# Patient Record
Sex: FEMALE | URBAN - METROPOLITAN AREA
[De-identification: names, ages, dates, MRNs, and addresses within clinical notes are randomized per-mention and may not be internally consistent; named-entity substitution may affect disease eponyms.]

---

## 2017-02-01 ENCOUNTER — FOLLOW UP (OUTPATIENT)
Dept: URBAN - METROPOLITAN AREA CLINIC 35 | Facility: CLINIC | Age: 72
End: 2017-02-01

## 2017-02-01 DIAGNOSIS — H35.721: ICD-10-CM

## 2017-02-01 DIAGNOSIS — H35.3120: ICD-10-CM

## 2017-02-01 DIAGNOSIS — H35.3211: ICD-10-CM

## 2017-02-01 PROCEDURE — 2019F DILATED MACUL EXAM DONE: CPT

## 2017-02-01 PROCEDURE — G8427 DOCREV CUR MEDS BY ELIG CLIN: HCPCS

## 2017-02-01 PROCEDURE — 4177F TALK PT/CRGVR RE AREDS PREV: CPT

## 2017-02-01 PROCEDURE — 92012 INTRM OPH EXAM EST PATIENT: CPT

## 2017-02-01 PROCEDURE — 92134 CPTRZ OPH DX IMG PST SGM RTA: CPT

## 2017-02-01 PROCEDURE — 4040F PNEUMOC VAC/ADMIN/RCVD: CPT | Mod: 8P

## 2017-02-01 ASSESSMENT — VISUAL ACUITY: OD_SC: 20/50+2

## 2017-02-01 ASSESSMENT — TONOMETRY: OD_IOP_MMHG: 12

## 2017-03-15 ENCOUNTER — FOLLOW UP (OUTPATIENT)
Dept: URBAN - METROPOLITAN AREA CLINIC 35 | Facility: CLINIC | Age: 72
End: 2017-03-15

## 2017-03-15 DIAGNOSIS — H35.3120: ICD-10-CM

## 2017-03-15 DIAGNOSIS — H35.3231: ICD-10-CM

## 2017-03-15 DIAGNOSIS — H35.721: ICD-10-CM

## 2017-03-15 PROCEDURE — 2019F DILATED MACUL EXAM DONE: CPT

## 2017-03-15 PROCEDURE — 92014 COMPRE OPH EXAM EST PT 1/>: CPT | Mod: 25

## 2017-03-15 PROCEDURE — 4040F PNEUMOC VAC/ADMIN/RCVD: CPT | Mod: 8P

## 2017-03-15 PROCEDURE — 4177F TALK PT/CRGVR RE AREDS PREV: CPT

## 2017-03-15 PROCEDURE — 92134 CPTRZ OPH DX IMG PST SGM RTA: CPT

## 2017-03-15 PROCEDURE — 67028 INJECTION EYE DRUG: CPT

## 2017-03-15 PROCEDURE — G8427 DOCREV CUR MEDS BY ELIG CLIN: HCPCS

## 2017-03-15 PROCEDURE — 92235 FLUORESCEIN ANGRPH MLTIFRAME: CPT

## 2017-03-15 PROCEDURE — 92250 FUNDUS PHOTOGRAPHY W/I&R: CPT

## 2017-03-15 ASSESSMENT — TONOMETRY
OS_IOP_MMHG: 11
OD_IOP_MMHG: 12

## 2017-03-15 ASSESSMENT — VISUAL ACUITY
OS_SC: 20/100
OD_SC: 20/40-1

## 2017-03-29 ENCOUNTER — PROCEDURE ONLY (OUTPATIENT)
Dept: URBAN - METROPOLITAN AREA CLINIC 35 | Facility: CLINIC | Age: 72
End: 2017-03-29

## 2017-03-29 DIAGNOSIS — H35.721: ICD-10-CM

## 2017-03-29 PROCEDURE — 67028 INJECTION EYE DRUG: CPT

## 2017-03-29 ASSESSMENT — TONOMETRY: OD_IOP_MMHG: 20

## 2017-03-29 ASSESSMENT — VISUAL ACUITY: OD_SC: 20/30-1

## 2017-04-12 ENCOUNTER — FOLLOW UP (OUTPATIENT)
Dept: URBAN - METROPOLITAN AREA CLINIC 35 | Facility: CLINIC | Age: 72
End: 2017-04-12

## 2017-04-12 DIAGNOSIS — H35.3231: ICD-10-CM

## 2017-04-12 DIAGNOSIS — H35.721: ICD-10-CM

## 2017-04-12 PROCEDURE — 92012 INTRM OPH EXAM EST PATIENT: CPT

## 2017-04-12 PROCEDURE — 4040F PNEUMOC VAC/ADMIN/RCVD: CPT | Mod: 8P

## 2017-04-12 PROCEDURE — G8428 CUR MEDS NOT DOCUMENT: HCPCS

## 2017-04-12 PROCEDURE — 2019F DILATED MACUL EXAM DONE: CPT

## 2017-04-12 PROCEDURE — 4177F TALK PT/CRGVR RE AREDS PREV: CPT

## 2017-04-12 PROCEDURE — 92134 CPTRZ OPH DX IMG PST SGM RTA: CPT

## 2017-04-12 ASSESSMENT — TONOMETRY
OD_IOP_MMHG: 16
OS_IOP_MMHG: 17

## 2017-04-12 ASSESSMENT — VISUAL ACUITY
OS_CC: 20/40+1
OD_SC: 20/40+2

## 2017-04-26 ENCOUNTER — FOLLOW UP (OUTPATIENT)
Dept: URBAN - METROPOLITAN AREA CLINIC 35 | Facility: CLINIC | Age: 72
End: 2017-04-26

## 2017-04-26 DIAGNOSIS — H35.721: ICD-10-CM

## 2017-04-26 DIAGNOSIS — H35.3231: ICD-10-CM

## 2017-04-26 PROCEDURE — 92012 INTRM OPH EXAM EST PATIENT: CPT | Mod: 25

## 2017-04-26 PROCEDURE — G8428 CUR MEDS NOT DOCUMENT: HCPCS

## 2017-04-26 PROCEDURE — 67028 INJECTION EYE DRUG: CPT

## 2017-04-26 PROCEDURE — 2019F DILATED MACUL EXAM DONE: CPT

## 2017-04-26 PROCEDURE — 4177F TALK PT/CRGVR RE AREDS PREV: CPT

## 2017-04-26 PROCEDURE — 4040F PNEUMOC VAC/ADMIN/RCVD: CPT | Mod: 8P

## 2017-04-26 PROCEDURE — 92134 CPTRZ OPH DX IMG PST SGM RTA: CPT

## 2017-04-26 ASSESSMENT — VISUAL ACUITY
OD_SC: 20/30-1
OS_CC: 20/25

## 2017-04-26 ASSESSMENT — TONOMETRY
OD_IOP_MMHG: 14
OS_IOP_MMHG: 14

## 2017-05-10 ENCOUNTER — PROCEDURE ONLY (OUTPATIENT)
Dept: URBAN - METROPOLITAN AREA CLINIC 35 | Facility: CLINIC | Age: 72
End: 2017-05-10

## 2017-05-10 DIAGNOSIS — H35.721: ICD-10-CM

## 2017-05-10 PROCEDURE — 67028 INJECTION EYE DRUG: CPT

## 2017-05-10 ASSESSMENT — VISUAL ACUITY: OD_SC: 20/30-2

## 2017-05-10 ASSESSMENT — TONOMETRY: OD_IOP_MMHG: 14

## 2017-06-07 ENCOUNTER — FOLLOW UP (OUTPATIENT)
Dept: URBAN - METROPOLITAN AREA CLINIC 35 | Facility: CLINIC | Age: 72
End: 2017-06-07

## 2017-06-07 DIAGNOSIS — H35.721: ICD-10-CM

## 2017-06-07 DIAGNOSIS — H35.3231: ICD-10-CM

## 2017-06-07 PROCEDURE — 67028 INJECTION EYE DRUG: CPT

## 2017-06-07 PROCEDURE — 2019F DILATED MACUL EXAM DONE: CPT

## 2017-06-07 PROCEDURE — 92012 INTRM OPH EXAM EST PATIENT: CPT | Mod: 25

## 2017-06-07 PROCEDURE — 92134 CPTRZ OPH DX IMG PST SGM RTA: CPT

## 2017-06-07 PROCEDURE — G8428 CUR MEDS NOT DOCUMENT: HCPCS

## 2017-06-07 PROCEDURE — 4040F PNEUMOC VAC/ADMIN/RCVD: CPT | Mod: 8P

## 2017-06-07 PROCEDURE — 4177F TALK PT/CRGVR RE AREDS PREV: CPT

## 2017-06-07 ASSESSMENT — VISUAL ACUITY
OD_SC: 20/50
OS_CC: 20/25

## 2017-06-07 ASSESSMENT — TONOMETRY
OS_IOP_MMHG: 14
OD_IOP_MMHG: 14

## 2017-07-19 ENCOUNTER — FOLLOW UP (OUTPATIENT)
Dept: URBAN - METROPOLITAN AREA CLINIC 35 | Facility: CLINIC | Age: 72
End: 2017-07-19

## 2017-07-19 DIAGNOSIS — H35.721: ICD-10-CM

## 2017-07-19 DIAGNOSIS — H35.3231: ICD-10-CM

## 2017-07-19 PROCEDURE — 92014 COMPRE OPH EXAM EST PT 1/>: CPT | Mod: 25

## 2017-07-19 PROCEDURE — 67028 INJECTION EYE DRUG: CPT

## 2017-07-19 PROCEDURE — G8427 DOCREV CUR MEDS BY ELIG CLIN: HCPCS

## 2017-07-19 PROCEDURE — 4177F TALK PT/CRGVR RE AREDS PREV: CPT

## 2017-07-19 PROCEDURE — 92134 CPTRZ OPH DX IMG PST SGM RTA: CPT

## 2017-07-19 PROCEDURE — 4040F PNEUMOC VAC/ADMIN/RCVD: CPT | Mod: 8P

## 2017-07-19 PROCEDURE — 2019F DILATED MACUL EXAM DONE: CPT

## 2017-07-19 ASSESSMENT — VISUAL ACUITY
OS_CC: 20/40-1
OD_CC: 20/40-1

## 2017-07-19 ASSESSMENT — TONOMETRY
OS_IOP_MMHG: 13
OD_IOP_MMHG: 12

## 2017-08-23 ENCOUNTER — FOLLOW UP (OUTPATIENT)
Dept: URBAN - METROPOLITAN AREA CLINIC 35 | Facility: CLINIC | Age: 72
End: 2017-08-23

## 2017-08-23 DIAGNOSIS — H35.3231: ICD-10-CM

## 2017-08-23 DIAGNOSIS — H35.721: ICD-10-CM

## 2017-08-23 PROCEDURE — 67028 INJECTION EYE DRUG: CPT

## 2017-08-23 PROCEDURE — 92012 INTRM OPH EXAM EST PATIENT: CPT | Mod: 25

## 2017-08-23 PROCEDURE — 4177F TALK PT/CRGVR RE AREDS PREV: CPT

## 2017-08-23 PROCEDURE — 2019F DILATED MACUL EXAM DONE: CPT

## 2017-08-23 PROCEDURE — G8428 CUR MEDS NOT DOCUMENT: HCPCS

## 2017-08-23 PROCEDURE — 4040F PNEUMOC VAC/ADMIN/RCVD: CPT | Mod: 8P

## 2017-08-23 PROCEDURE — 92134 CPTRZ OPH DX IMG PST SGM RTA: CPT

## 2017-08-23 ASSESSMENT — TONOMETRY
OD_IOP_MMHG: 18
OS_IOP_MMHG: 17

## 2017-08-23 ASSESSMENT — VISUAL ACUITY
OD_SC: 20/200
OS_SC: 20/30+2

## 2017-09-27 ENCOUNTER — FOLLOW UP (OUTPATIENT)
Dept: URBAN - METROPOLITAN AREA CLINIC 35 | Facility: CLINIC | Age: 72
End: 2017-09-27

## 2017-09-27 DIAGNOSIS — H35.721: ICD-10-CM

## 2017-09-27 DIAGNOSIS — H35.3231: ICD-10-CM

## 2017-09-27 PROCEDURE — 4177F TALK PT/CRGVR RE AREDS PREV: CPT

## 2017-09-27 PROCEDURE — G8428 CUR MEDS NOT DOCUMENT: HCPCS

## 2017-09-27 PROCEDURE — 4040F PNEUMOC VAC/ADMIN/RCVD: CPT | Mod: 8P

## 2017-09-27 PROCEDURE — G9744 PT NOT ELI D/T ACT DIG HTN: HCPCS

## 2017-09-27 PROCEDURE — 2019F DILATED MACUL EXAM DONE: CPT

## 2017-09-27 PROCEDURE — 92134 CPTRZ OPH DX IMG PST SGM RTA: CPT

## 2017-09-27 PROCEDURE — 92012 INTRM OPH EXAM EST PATIENT: CPT | Mod: 25

## 2017-09-27 PROCEDURE — 67028 INJECTION EYE DRUG: CPT

## 2017-09-27 ASSESSMENT — TONOMETRY
OD_IOP_MMHG: 15
OS_IOP_MMHG: 14

## 2017-09-27 ASSESSMENT — VISUAL ACUITY
OD_CC: 20/40-2
OS_CC: 20/40

## 2017-10-04 ENCOUNTER — PROCEDURE ONLY (OUTPATIENT)
Dept: URBAN - METROPOLITAN AREA CLINIC 35 | Facility: CLINIC | Age: 72
End: 2017-10-04

## 2017-10-04 DIAGNOSIS — H35.3231: ICD-10-CM

## 2017-10-04 PROCEDURE — G9744 PT NOT ELI D/T ACT DIG HTN: HCPCS

## 2017-10-04 PROCEDURE — 4177F TALK PT/CRGVR RE AREDS PREV: CPT

## 2017-10-04 PROCEDURE — 2019F DILATED MACUL EXAM DONE: CPT

## 2017-10-04 PROCEDURE — G8428 CUR MEDS NOT DOCUMENT: HCPCS

## 2017-10-04 PROCEDURE — 67028 INJECTION EYE DRUG: CPT

## 2017-10-04 PROCEDURE — 4040F PNEUMOC VAC/ADMIN/RCVD: CPT | Mod: 8P

## 2017-10-04 ASSESSMENT — VISUAL ACUITY: OS_CC: 20/40

## 2017-10-04 ASSESSMENT — TONOMETRY: OS_IOP_MMHG: 14

## 2017-11-08 ENCOUNTER — FOLLOW UP (OUTPATIENT)
Dept: URBAN - METROPOLITAN AREA CLINIC 35 | Facility: CLINIC | Age: 72
End: 2017-11-08

## 2017-11-08 DIAGNOSIS — Z96.1: ICD-10-CM

## 2017-11-08 DIAGNOSIS — H35.3231: ICD-10-CM

## 2017-11-08 DIAGNOSIS — H35.721: ICD-10-CM

## 2017-11-08 PROCEDURE — G8428 CUR MEDS NOT DOCUMENT: HCPCS

## 2017-11-08 PROCEDURE — 92134 CPTRZ OPH DX IMG PST SGM RTA: CPT

## 2017-11-08 PROCEDURE — 4040F PNEUMOC VAC/ADMIN/RCVD: CPT | Mod: 8P

## 2017-11-08 PROCEDURE — 67028 INJECTION EYE DRUG: CPT

## 2017-11-08 PROCEDURE — 4177F TALK PT/CRGVR RE AREDS PREV: CPT

## 2017-11-08 PROCEDURE — 92012 INTRM OPH EXAM EST PATIENT: CPT | Mod: 25

## 2017-11-08 PROCEDURE — 2019F DILATED MACUL EXAM DONE: CPT

## 2017-11-08 PROCEDURE — G9744 PT NOT ELI D/T ACT DIG HTN: HCPCS

## 2017-11-08 ASSESSMENT — VISUAL ACUITY
OD_CC: 20/30-2
OS_CC: 20/25-1

## 2017-11-08 ASSESSMENT — TONOMETRY
OS_IOP_MMHG: 19
OD_IOP_MMHG: 18

## 2017-11-15 ENCOUNTER — PROCEDURE ONLY (OUTPATIENT)
Dept: URBAN - METROPOLITAN AREA CLINIC 35 | Facility: CLINIC | Age: 72
End: 2017-11-15

## 2017-11-15 DIAGNOSIS — H35.3231: ICD-10-CM

## 2017-11-15 PROCEDURE — 67028 INJECTION EYE DRUG: CPT

## 2017-11-15 PROCEDURE — G9744 PT NOT ELI D/T ACT DIG HTN: HCPCS

## 2017-11-15 PROCEDURE — G8428 CUR MEDS NOT DOCUMENT: HCPCS

## 2017-11-15 PROCEDURE — 4040F PNEUMOC VAC/ADMIN/RCVD: CPT | Mod: 8P

## 2017-11-15 PROCEDURE — 4177F TALK PT/CRGVR RE AREDS PREV: CPT

## 2017-11-15 PROCEDURE — 2019F DILATED MACUL EXAM DONE: CPT

## 2017-11-15 ASSESSMENT — TONOMETRY: OS_IOP_MMHG: 15

## 2017-11-15 ASSESSMENT — VISUAL ACUITY: OS_CC: 20/25-2

## 2017-12-13 ENCOUNTER — FOLLOW UP (OUTPATIENT)
Dept: URBAN - METROPOLITAN AREA CLINIC 35 | Facility: CLINIC | Age: 72
End: 2017-12-13

## 2017-12-13 DIAGNOSIS — Z96.1: ICD-10-CM

## 2017-12-13 DIAGNOSIS — H35.3231: ICD-10-CM

## 2017-12-13 DIAGNOSIS — H35.721: ICD-10-CM

## 2017-12-13 PROCEDURE — 4040F PNEUMOC VAC/ADMIN/RCVD: CPT | Mod: 8P

## 2017-12-13 PROCEDURE — 2019F DILATED MACUL EXAM DONE: CPT

## 2017-12-13 PROCEDURE — G9744 PT NOT ELI D/T ACT DIG HTN: HCPCS

## 2017-12-13 PROCEDURE — 92014 COMPRE OPH EXAM EST PT 1/>: CPT

## 2017-12-13 PROCEDURE — G8427 DOCREV CUR MEDS BY ELIG CLIN: HCPCS

## 2017-12-13 PROCEDURE — 4177F TALK PT/CRGVR RE AREDS PREV: CPT

## 2017-12-13 PROCEDURE — 92134 CPTRZ OPH DX IMG PST SGM RTA: CPT

## 2017-12-13 ASSESSMENT — VISUAL ACUITY
OS_CC: 20/30+2
OD_CC: 20/50

## 2017-12-13 ASSESSMENT — TONOMETRY
OD_IOP_MMHG: 13
OS_IOP_MMHG: 12

## 2018-01-17 ENCOUNTER — FOLLOW UP (OUTPATIENT)
Dept: URBAN - METROPOLITAN AREA CLINIC 35 | Facility: CLINIC | Age: 73
End: 2018-01-17

## 2018-01-17 DIAGNOSIS — H35.721: ICD-10-CM

## 2018-01-17 DIAGNOSIS — Z96.1: ICD-10-CM

## 2018-01-17 DIAGNOSIS — H35.3231: ICD-10-CM

## 2018-01-17 PROCEDURE — 92012 INTRM OPH EXAM EST PATIENT: CPT | Mod: 25

## 2018-01-17 PROCEDURE — 92134 CPTRZ OPH DX IMG PST SGM RTA: CPT

## 2018-01-17 PROCEDURE — 67028 INJECTION EYE DRUG: CPT

## 2018-01-17 ASSESSMENT — VISUAL ACUITY
OD_CC: 20/50
OS_CC: 20/30-1

## 2018-01-17 ASSESSMENT — TONOMETRY
OD_IOP_MMHG: 15
OS_IOP_MMHG: 15

## 2018-01-24 ENCOUNTER — PROCEDURE ONLY (OUTPATIENT)
Dept: URBAN - METROPOLITAN AREA CLINIC 35 | Facility: CLINIC | Age: 73
End: 2018-01-24

## 2018-01-24 DIAGNOSIS — H35.3231: ICD-10-CM

## 2018-01-24 PROCEDURE — 67028 INJECTION EYE DRUG: CPT

## 2018-01-24 ASSESSMENT — TONOMETRY: OS_IOP_MMHG: 13

## 2018-01-24 ASSESSMENT — VISUAL ACUITY: OS_CC: 20/40+2

## 2018-02-28 ENCOUNTER — FOLLOW UP (OUTPATIENT)
Dept: URBAN - METROPOLITAN AREA CLINIC 35 | Facility: CLINIC | Age: 73
End: 2018-02-28

## 2018-02-28 DIAGNOSIS — H35.3231: ICD-10-CM

## 2018-02-28 DIAGNOSIS — Z96.1: ICD-10-CM

## 2018-02-28 DIAGNOSIS — H35.721: ICD-10-CM

## 2018-02-28 PROCEDURE — 92012 INTRM OPH EXAM EST PATIENT: CPT | Mod: 25

## 2018-02-28 PROCEDURE — 67028 INJECTION EYE DRUG: CPT

## 2018-02-28 PROCEDURE — 92134 CPTRZ OPH DX IMG PST SGM RTA: CPT

## 2018-02-28 ASSESSMENT — TONOMETRY
OD_IOP_MMHG: 14
OS_IOP_MMHG: 13

## 2018-02-28 ASSESSMENT — VISUAL ACUITY
OD_CC: 20/60+
OS_CC: 20/30

## 2018-03-07 ENCOUNTER — FOLLOW UP (OUTPATIENT)
Dept: URBAN - METROPOLITAN AREA CLINIC 35 | Facility: CLINIC | Age: 73
End: 2018-03-07

## 2018-03-07 DIAGNOSIS — H35.721: ICD-10-CM

## 2018-03-07 DIAGNOSIS — H35.3231: ICD-10-CM

## 2018-03-07 DIAGNOSIS — H43.391: ICD-10-CM

## 2018-03-07 DIAGNOSIS — Z96.1: ICD-10-CM

## 2018-03-07 PROCEDURE — 67028 INJECTION EYE DRUG: CPT

## 2018-03-07 PROCEDURE — 92012 INTRM OPH EXAM EST PATIENT: CPT | Mod: 24

## 2018-03-07 ASSESSMENT — VISUAL ACUITY
OD_CC: 20/40+2
OS_CC: 20/25-2

## 2018-03-07 ASSESSMENT — TONOMETRY
OD_IOP_MMHG: 15
OS_IOP_MMHG: 12

## 2018-05-02 ENCOUNTER — FOLLOW UP (OUTPATIENT)
Dept: URBAN - METROPOLITAN AREA CLINIC 35 | Facility: CLINIC | Age: 73
End: 2018-05-02

## 2018-05-02 DIAGNOSIS — H35.721: ICD-10-CM

## 2018-05-02 DIAGNOSIS — Z96.1: ICD-10-CM

## 2018-05-02 DIAGNOSIS — H35.3231: ICD-10-CM

## 2018-05-02 DIAGNOSIS — H43.391: ICD-10-CM

## 2018-05-02 PROCEDURE — 92014 COMPRE OPH EXAM EST PT 1/>: CPT

## 2018-05-02 PROCEDURE — 92134 CPTRZ OPH DX IMG PST SGM RTA: CPT

## 2018-05-02 PROCEDURE — 67028 INJECTION EYE DRUG: CPT

## 2018-05-02 ASSESSMENT — TONOMETRY
OD_IOP_MMHG: 20
OS_IOP_MMHG: 14

## 2018-05-02 ASSESSMENT — VISUAL ACUITY
OS_CC: 20/70-1
OD_CC: 20/60-2

## 2018-05-07 ENCOUNTER — PROCEDURE ONLY (OUTPATIENT)
Dept: URBAN - METROPOLITAN AREA CLINIC 35 | Facility: CLINIC | Age: 73
End: 2018-05-07

## 2018-05-07 DIAGNOSIS — H35.3231: ICD-10-CM

## 2018-05-07 PROCEDURE — 67028 INJECTION EYE DRUG: CPT

## 2018-05-07 ASSESSMENT — TONOMETRY: OS_IOP_MMHG: 15

## 2018-05-07 ASSESSMENT — VISUAL ACUITY: OS_CC: 20/80-1

## 2018-06-20 ENCOUNTER — FOLLOW UP (OUTPATIENT)
Dept: URBAN - METROPOLITAN AREA CLINIC 35 | Facility: CLINIC | Age: 73
End: 2018-06-20

## 2018-06-20 DIAGNOSIS — H35.721: ICD-10-CM

## 2018-06-20 DIAGNOSIS — Z96.1: ICD-10-CM

## 2018-06-20 DIAGNOSIS — H35.3231: ICD-10-CM

## 2018-06-20 PROCEDURE — 92134 CPTRZ OPH DX IMG PST SGM RTA: CPT

## 2018-06-20 PROCEDURE — 92012 INTRM OPH EXAM EST PATIENT: CPT

## 2018-06-20 ASSESSMENT — VISUAL ACUITY
OD_CC: CF 3FT
OS_CC: 20/60

## 2018-06-20 ASSESSMENT — TONOMETRY
OD_IOP_MMHG: 15
OS_IOP_MMHG: 13

## 2018-07-11 ENCOUNTER — FOLLOW UP (OUTPATIENT)
Dept: URBAN - METROPOLITAN AREA CLINIC 35 | Facility: CLINIC | Age: 73
End: 2018-07-11

## 2018-07-11 DIAGNOSIS — H35.721: ICD-10-CM

## 2018-07-11 DIAGNOSIS — Z96.1: ICD-10-CM

## 2018-07-11 DIAGNOSIS — H35.3231: ICD-10-CM

## 2018-07-11 PROCEDURE — 67028 INJECTION EYE DRUG: CPT

## 2018-07-11 PROCEDURE — 92134 CPTRZ OPH DX IMG PST SGM RTA: CPT

## 2018-07-11 PROCEDURE — 92012 INTRM OPH EXAM EST PATIENT: CPT | Mod: 25

## 2018-07-11 ASSESSMENT — TONOMETRY
OD_IOP_MMHG: 14
OS_IOP_MMHG: 16

## 2018-07-11 ASSESSMENT — VISUAL ACUITY
OS_SC: 20/70
OD_SC: 20/400+1

## 2018-07-20 ENCOUNTER — PROCEDURE ONLY (OUTPATIENT)
Dept: URBAN - METROPOLITAN AREA CLINIC 35 | Facility: CLINIC | Age: 73
End: 2018-07-20

## 2018-07-20 DIAGNOSIS — H35.721: ICD-10-CM

## 2018-07-20 PROCEDURE — 67028 INJECTION EYE DRUG: CPT

## 2018-07-20 ASSESSMENT — TONOMETRY: OD_IOP_MMHG: 19

## 2018-07-20 ASSESSMENT — VISUAL ACUITY: OD_CC: 20/400+1

## 2018-08-08 ENCOUNTER — FOLLOW UP (OUTPATIENT)
Dept: URBAN - METROPOLITAN AREA CLINIC 35 | Facility: CLINIC | Age: 73
End: 2018-08-08

## 2018-08-08 DIAGNOSIS — H35.721: ICD-10-CM

## 2018-08-08 DIAGNOSIS — H35.3231: ICD-10-CM

## 2018-08-08 DIAGNOSIS — Z96.1: ICD-10-CM

## 2018-08-08 PROCEDURE — 92012 INTRM OPH EXAM EST PATIENT: CPT

## 2018-08-08 ASSESSMENT — VISUAL ACUITY
OS_CC: 20/40
OD_CC: 20/100-2

## 2018-08-08 ASSESSMENT — TONOMETRY
OD_IOP_MMHG: 15
OS_IOP_MMHG: 16

## 2018-08-22 ENCOUNTER — FOLLOW UP (OUTPATIENT)
Dept: URBAN - METROPOLITAN AREA CLINIC 35 | Facility: CLINIC | Age: 73
End: 2018-08-22

## 2018-08-22 DIAGNOSIS — H35.721: ICD-10-CM

## 2018-08-22 DIAGNOSIS — H35.3231: ICD-10-CM

## 2018-08-22 DIAGNOSIS — Z96.1: ICD-10-CM

## 2018-08-22 PROCEDURE — 92134 CPTRZ OPH DX IMG PST SGM RTA: CPT

## 2018-08-22 PROCEDURE — 67028 INJECTION EYE DRUG: CPT

## 2018-08-22 PROCEDURE — 92012 INTRM OPH EXAM EST PATIENT: CPT | Mod: 25

## 2018-08-22 ASSESSMENT — TONOMETRY
OD_IOP_MMHG: 14
OS_IOP_MMHG: 13

## 2018-08-22 ASSESSMENT — VISUAL ACUITY
OS_CC: 20/200
OD_CC: 20/60-1

## 2018-09-05 ENCOUNTER — PROCEDURE ONLY (OUTPATIENT)
Dept: URBAN - METROPOLITAN AREA CLINIC 35 | Facility: CLINIC | Age: 73
End: 2018-09-05

## 2018-09-05 DIAGNOSIS — H35.721: ICD-10-CM

## 2018-09-05 PROCEDURE — 67028 INJECTION EYE DRUG: CPT

## 2018-09-05 ASSESSMENT — TONOMETRY: OD_IOP_MMHG: 18

## 2018-09-05 ASSESSMENT — VISUAL ACUITY: OD_CC: 20/100-

## 2018-10-03 ENCOUNTER — FOLLOW UP (OUTPATIENT)
Dept: URBAN - METROPOLITAN AREA CLINIC 35 | Facility: CLINIC | Age: 73
End: 2018-10-03

## 2018-10-03 DIAGNOSIS — Z96.1: ICD-10-CM

## 2018-10-03 DIAGNOSIS — H35.721: ICD-10-CM

## 2018-10-03 DIAGNOSIS — H35.3231: ICD-10-CM

## 2018-10-03 PROCEDURE — 92014 COMPRE OPH EXAM EST PT 1/>: CPT | Mod: 25

## 2018-10-03 PROCEDURE — 92134 CPTRZ OPH DX IMG PST SGM RTA: CPT

## 2018-10-03 PROCEDURE — 67028 INJECTION EYE DRUG: CPT

## 2018-10-03 ASSESSMENT — TONOMETRY
OD_IOP_MMHG: 12
OS_IOP_MMHG: 12

## 2018-10-03 ASSESSMENT — VISUAL ACUITY
OD_CC: CF 3FT
OS_CC: 20/50-2

## 2018-10-17 ENCOUNTER — PROCEDURE ONLY (OUTPATIENT)
Dept: URBAN - METROPOLITAN AREA CLINIC 35 | Facility: CLINIC | Age: 73
End: 2018-10-17

## 2018-10-17 DIAGNOSIS — H35.721: ICD-10-CM

## 2018-10-17 PROCEDURE — 67028 INJECTION EYE DRUG: CPT

## 2018-10-17 ASSESSMENT — TONOMETRY: OD_IOP_MMHG: 15

## 2018-10-17 ASSESSMENT — VISUAL ACUITY: OD_CC: CF 3FT

## 2018-11-14 ENCOUNTER — FOLLOW UP (OUTPATIENT)
Dept: URBAN - METROPOLITAN AREA CLINIC 35 | Facility: CLINIC | Age: 73
End: 2018-11-14

## 2018-11-14 DIAGNOSIS — H35.3231: ICD-10-CM

## 2018-11-14 DIAGNOSIS — Z96.1: ICD-10-CM

## 2018-11-14 DIAGNOSIS — H35.721: ICD-10-CM

## 2018-11-14 PROCEDURE — 92012 INTRM OPH EXAM EST PATIENT: CPT

## 2018-11-14 PROCEDURE — 92134 CPTRZ OPH DX IMG PST SGM RTA: CPT

## 2018-11-14 ASSESSMENT — VISUAL ACUITY
OD_CC: CF 3FT
OS_CC: 20/50+2

## 2018-11-14 ASSESSMENT — TONOMETRY
OD_IOP_MMHG: 15
OS_IOP_MMHG: 16

## 2018-12-12 ENCOUNTER — FOLLOW UP (OUTPATIENT)
Dept: URBAN - METROPOLITAN AREA CLINIC 35 | Facility: CLINIC | Age: 73
End: 2018-12-12

## 2018-12-12 DIAGNOSIS — Z96.1: ICD-10-CM

## 2018-12-12 DIAGNOSIS — H35.3231: ICD-10-CM

## 2018-12-12 DIAGNOSIS — H35.721: ICD-10-CM

## 2018-12-12 PROCEDURE — 92134 CPTRZ OPH DX IMG PST SGM RTA: CPT

## 2018-12-12 PROCEDURE — 67028 INJECTION EYE DRUG: CPT

## 2018-12-12 PROCEDURE — 92012 INTRM OPH EXAM EST PATIENT: CPT | Mod: 25

## 2018-12-12 ASSESSMENT — VISUAL ACUITY
OD_CC: CF 4FT
OS_CC: 20/60+1

## 2018-12-12 ASSESSMENT — TONOMETRY
OD_IOP_MMHG: 15
OS_IOP_MMHG: 15

## 2018-12-19 ENCOUNTER — PROCEDURE ONLY (OUTPATIENT)
Dept: URBAN - METROPOLITAN AREA CLINIC 35 | Facility: CLINIC | Age: 73
End: 2018-12-19

## 2018-12-19 DIAGNOSIS — H35.721: ICD-10-CM

## 2018-12-19 PROCEDURE — 67028 INJECTION EYE DRUG: CPT

## 2018-12-19 ASSESSMENT — TONOMETRY: OD_IOP_MMHG: 17

## 2018-12-19 ASSESSMENT — VISUAL ACUITY: OD_CC: CF 3FT

## 2019-01-23 ENCOUNTER — FOLLOW UP (OUTPATIENT)
Dept: URBAN - METROPOLITAN AREA CLINIC 35 | Facility: CLINIC | Age: 74
End: 2019-01-23

## 2019-01-23 DIAGNOSIS — H35.3231: ICD-10-CM

## 2019-01-23 DIAGNOSIS — H35.721: ICD-10-CM

## 2019-01-23 DIAGNOSIS — Z96.1: ICD-10-CM

## 2019-01-23 PROCEDURE — 92014 COMPRE OPH EXAM EST PT 1/>: CPT | Mod: 25

## 2019-01-23 PROCEDURE — 67028 INJECTION EYE DRUG: CPT

## 2019-01-23 PROCEDURE — 92134 CPTRZ OPH DX IMG PST SGM RTA: CPT

## 2019-01-23 ASSESSMENT — TONOMETRY
OS_IOP_MMHG: 14
OD_IOP_MMHG: 13

## 2019-01-23 ASSESSMENT — VISUAL ACUITY
OD_CC: CF 4FT
OS_CC: 20/40

## 2019-03-06 ENCOUNTER — FOLLOW UP (OUTPATIENT)
Dept: URBAN - METROPOLITAN AREA CLINIC 35 | Facility: CLINIC | Age: 74
End: 2019-03-06

## 2019-03-06 DIAGNOSIS — H35.721: ICD-10-CM

## 2019-03-06 DIAGNOSIS — H35.3231: ICD-10-CM

## 2019-03-06 DIAGNOSIS — Z96.1: ICD-10-CM

## 2019-03-06 PROCEDURE — 67028 INJECTION EYE DRUG: CPT

## 2019-03-06 PROCEDURE — 92012 INTRM OPH EXAM EST PATIENT: CPT | Mod: 25

## 2019-03-06 PROCEDURE — 92134 CPTRZ OPH DX IMG PST SGM RTA: CPT

## 2019-03-06 ASSESSMENT — TONOMETRY
OD_IOP_MMHG: 11
OS_IOP_MMHG: 12

## 2019-03-06 ASSESSMENT — VISUAL ACUITY
OS_CC: 20/50+2
OD_CC: CF 3FT

## 2019-03-08 ENCOUNTER — UNSCHEDULED FOLLOW UP (OUTPATIENT)
Dept: URBAN - METROPOLITAN AREA CLINIC 35 | Facility: CLINIC | Age: 74
End: 2019-03-08

## 2019-03-08 DIAGNOSIS — H35.3231: ICD-10-CM

## 2019-03-08 DIAGNOSIS — Z96.1: ICD-10-CM

## 2019-03-08 DIAGNOSIS — H35.721: ICD-10-CM

## 2019-03-08 PROCEDURE — 67028 INJECTION EYE DRUG: CPT

## 2019-03-08 PROCEDURE — 92012 INTRM OPH EXAM EST PATIENT: CPT | Mod: 25

## 2019-03-08 PROCEDURE — 92134 CPTRZ OPH DX IMG PST SGM RTA: CPT

## 2019-03-08 ASSESSMENT — VISUAL ACUITY: OS_CC: 20/70-1

## 2019-03-08 ASSESSMENT — TONOMETRY: OS_IOP_MMHG: 15

## 2019-04-17 ENCOUNTER — FOLLOW UP (OUTPATIENT)
Dept: URBAN - METROPOLITAN AREA CLINIC 35 | Facility: CLINIC | Age: 74
End: 2019-04-17

## 2019-04-17 DIAGNOSIS — Z96.1: ICD-10-CM

## 2019-04-17 DIAGNOSIS — H35.721: ICD-10-CM

## 2019-04-17 DIAGNOSIS — H35.3231: ICD-10-CM

## 2019-04-17 PROCEDURE — 92012 INTRM OPH EXAM EST PATIENT: CPT | Mod: 25

## 2019-04-17 PROCEDURE — 92134 CPTRZ OPH DX IMG PST SGM RTA: CPT

## 2019-04-17 PROCEDURE — 67028 INJECTION EYE DRUG: CPT

## 2019-04-17 ASSESSMENT — VISUAL ACUITY
OD_CC: CF 5FT
OS_CC: 20/30-1

## 2019-04-17 ASSESSMENT — TONOMETRY
OD_IOP_MMHG: 12
OS_IOP_MMHG: 15

## 2019-04-26 ENCOUNTER — PROCEDURE ONLY (OUTPATIENT)
Dept: URBAN - METROPOLITAN AREA CLINIC 35 | Facility: CLINIC | Age: 74
End: 2019-04-26

## 2019-04-26 DIAGNOSIS — H35.3231: ICD-10-CM

## 2019-04-26 PROCEDURE — 67028 INJECTION EYE DRUG: CPT

## 2019-04-26 ASSESSMENT — VISUAL ACUITY: OS_CC: 20/30

## 2019-04-26 ASSESSMENT — TONOMETRY: OS_IOP_MMHG: 17

## 2019-06-07 ENCOUNTER — FOLLOW UP (OUTPATIENT)
Dept: URBAN - METROPOLITAN AREA CLINIC 35 | Facility: CLINIC | Age: 74
End: 2019-06-07

## 2019-06-07 DIAGNOSIS — H35.3231: ICD-10-CM

## 2019-06-07 DIAGNOSIS — Z96.1: ICD-10-CM

## 2019-06-07 DIAGNOSIS — H35.721: ICD-10-CM

## 2019-06-07 PROCEDURE — 92134 CPTRZ OPH DX IMG PST SGM RTA: CPT

## 2019-06-07 PROCEDURE — 92012 INTRM OPH EXAM EST PATIENT: CPT | Mod: 25

## 2019-06-07 PROCEDURE — 67028 INJECTION EYE DRUG: CPT

## 2019-06-07 ASSESSMENT — VISUAL ACUITY
OD_CC: CF 3FT
OS_CC: 20/50+2

## 2019-06-07 ASSESSMENT — TONOMETRY
OD_IOP_MMHG: 17
OS_IOP_MMHG: 17

## 2019-06-14 ENCOUNTER — PROCEDURE ONLY (OUTPATIENT)
Dept: URBAN - METROPOLITAN AREA CLINIC 35 | Facility: CLINIC | Age: 74
End: 2019-06-14

## 2019-06-14 DIAGNOSIS — H35.3231: ICD-10-CM

## 2019-06-14 PROCEDURE — 67028 INJECTION EYE DRUG: CPT

## 2019-06-14 ASSESSMENT — VISUAL ACUITY: OS_CC: 20/50-2

## 2019-06-14 ASSESSMENT — TONOMETRY: OS_IOP_MMHG: 17

## 2019-07-26 ENCOUNTER — FOLLOW UP (OUTPATIENT)
Dept: URBAN - METROPOLITAN AREA CLINIC 35 | Facility: CLINIC | Age: 74
End: 2019-07-26

## 2019-07-26 DIAGNOSIS — Z96.1: ICD-10-CM

## 2019-07-26 DIAGNOSIS — H35.721: ICD-10-CM

## 2019-07-26 DIAGNOSIS — H35.3231: ICD-10-CM

## 2019-07-26 PROCEDURE — 92012 INTRM OPH EXAM EST PATIENT: CPT | Mod: 25

## 2019-07-26 PROCEDURE — 92134 CPTRZ OPH DX IMG PST SGM RTA: CPT

## 2019-07-26 PROCEDURE — 67028 INJECTION EYE DRUG: CPT

## 2019-07-26 ASSESSMENT — VISUAL ACUITY
OS_CC: 20/30-2
OD_CC: CF 3FT

## 2019-07-26 ASSESSMENT — TONOMETRY
OS_IOP_MMHG: 18
OD_IOP_MMHG: 17

## 2019-07-31 ENCOUNTER — PROCEDURE ONLY (OUTPATIENT)
Dept: URBAN - METROPOLITAN AREA CLINIC 35 | Facility: CLINIC | Age: 74
End: 2019-07-31

## 2019-07-31 DIAGNOSIS — H35.3231: ICD-10-CM

## 2019-07-31 PROCEDURE — 67028 INJECTION EYE DRUG: CPT

## 2019-07-31 ASSESSMENT — TONOMETRY: OS_IOP_MMHG: 17

## 2019-07-31 ASSESSMENT — VISUAL ACUITY: OS_SC: 20/40-

## 2019-09-04 ENCOUNTER — FOLLOW UP (OUTPATIENT)
Dept: URBAN - METROPOLITAN AREA CLINIC 35 | Facility: CLINIC | Age: 74
End: 2019-09-04

## 2019-09-04 DIAGNOSIS — H35.721: ICD-10-CM

## 2019-09-04 DIAGNOSIS — Z96.1: ICD-10-CM

## 2019-09-04 DIAGNOSIS — H35.3231: ICD-10-CM

## 2019-09-04 PROCEDURE — 92134 CPTRZ OPH DX IMG PST SGM RTA: CPT

## 2019-09-04 PROCEDURE — 92014 COMPRE OPH EXAM EST PT 1/>: CPT

## 2019-09-04 ASSESSMENT — TONOMETRY
OS_IOP_MMHG: 17
OD_IOP_MMHG: 17

## 2019-09-04 ASSESSMENT — VISUAL ACUITY
OS_CC: 20/40
OD_CC: CF 4FT

## 2019-09-18 ENCOUNTER — FOLLOW UP (OUTPATIENT)
Dept: URBAN - METROPOLITAN AREA CLINIC 35 | Facility: CLINIC | Age: 74
End: 2019-09-18

## 2019-09-18 DIAGNOSIS — H35.721: ICD-10-CM

## 2019-09-18 DIAGNOSIS — H35.3231: ICD-10-CM

## 2019-09-18 DIAGNOSIS — Z96.1: ICD-10-CM

## 2019-09-18 PROCEDURE — 92012 INTRM OPH EXAM EST PATIENT: CPT | Mod: 25

## 2019-09-18 PROCEDURE — 67028 INJECTION EYE DRUG: CPT

## 2019-09-18 PROCEDURE — 92134 CPTRZ OPH DX IMG PST SGM RTA: CPT

## 2019-09-18 ASSESSMENT — TONOMETRY
OS_IOP_MMHG: 16
OD_IOP_MMHG: 17

## 2019-09-18 ASSESSMENT — VISUAL ACUITY
OS_CC: 20/40
OD_CC: CF 5FT

## 2019-09-25 ENCOUNTER — PROCEDURE ONLY (OUTPATIENT)
Dept: URBAN - METROPOLITAN AREA CLINIC 35 | Facility: CLINIC | Age: 74
End: 2019-09-25

## 2019-09-25 DIAGNOSIS — H35.3231: ICD-10-CM

## 2019-09-25 PROCEDURE — 67028 INJECTION EYE DRUG: CPT

## 2019-09-25 ASSESSMENT — TONOMETRY: OD_IOP_MMHG: 14

## 2019-10-23 ENCOUNTER — FOLLOW UP (OUTPATIENT)
Dept: URBAN - METROPOLITAN AREA CLINIC 35 | Facility: CLINIC | Age: 74
End: 2019-10-23

## 2019-10-23 DIAGNOSIS — H35.3231: ICD-10-CM

## 2019-10-23 DIAGNOSIS — Z96.1: ICD-10-CM

## 2019-10-23 DIAGNOSIS — H35.721: ICD-10-CM

## 2019-10-23 PROCEDURE — 92012 INTRM OPH EXAM EST PATIENT: CPT

## 2019-10-23 PROCEDURE — 92134 CPTRZ OPH DX IMG PST SGM RTA: CPT

## 2019-10-23 ASSESSMENT — VISUAL ACUITY
OD_CC: CF 3FT
OS_CC: 20/40+1

## 2019-10-23 ASSESSMENT — TONOMETRY
OD_IOP_MMHG: 17
OS_IOP_MMHG: 16

## 2019-11-01 ENCOUNTER — PROCEDURE ONLY (OUTPATIENT)
Dept: URBAN - METROPOLITAN AREA CLINIC 35 | Facility: CLINIC | Age: 74
End: 2019-11-01

## 2019-11-01 DIAGNOSIS — H35.3231: ICD-10-CM

## 2019-11-01 DIAGNOSIS — Z96.1: ICD-10-CM

## 2019-11-01 DIAGNOSIS — H35.721: ICD-10-CM

## 2019-11-01 PROCEDURE — 67028 INJECTION EYE DRUG: CPT

## 2019-11-01 PROCEDURE — 92012 INTRM OPH EXAM EST PATIENT: CPT | Mod: 25

## 2019-11-01 ASSESSMENT — VISUAL ACUITY: OS_CC: 20/50-2

## 2019-11-01 ASSESSMENT — TONOMETRY: OS_IOP_MMHG: 17

## 2019-12-11 ENCOUNTER — FOLLOW UP (OUTPATIENT)
Dept: URBAN - METROPOLITAN AREA CLINIC 35 | Facility: CLINIC | Age: 74
End: 2019-12-11

## 2019-12-11 DIAGNOSIS — H35.3231: ICD-10-CM

## 2019-12-11 DIAGNOSIS — H35.721: ICD-10-CM

## 2019-12-11 DIAGNOSIS — Z96.1: ICD-10-CM

## 2019-12-11 PROCEDURE — 67028 INJECTION EYE DRUG: CPT

## 2019-12-11 PROCEDURE — 92012 INTRM OPH EXAM EST PATIENT: CPT | Mod: 25

## 2019-12-11 PROCEDURE — 92134 CPTRZ OPH DX IMG PST SGM RTA: CPT

## 2019-12-11 PROCEDURE — BEOSA BEOVU SAMPLE

## 2019-12-11 ASSESSMENT — TONOMETRY
OD_IOP_MMHG: 17
OS_IOP_MMHG: 17

## 2019-12-11 ASSESSMENT — VISUAL ACUITY
OD_CC: CF 2FT
OS_CC: 20/40-1

## 2019-12-18 ENCOUNTER — PROCEDURE ONLY (OUTPATIENT)
Dept: URBAN - METROPOLITAN AREA CLINIC 35 | Facility: CLINIC | Age: 74
End: 2019-12-18

## 2019-12-18 DIAGNOSIS — H35.3231: ICD-10-CM

## 2019-12-18 PROCEDURE — 67028 INJECTION EYE DRUG: CPT

## 2019-12-18 PROCEDURE — B INTRAVITREAL BEOVU

## 2019-12-18 ASSESSMENT — VISUAL ACUITY: OS_CC: 20/40+1

## 2019-12-18 ASSESSMENT — TONOMETRY: OS_IOP_MMHG: 18

## 2020-01-29 ENCOUNTER — FOLLOW UP (OUTPATIENT)
Dept: URBAN - METROPOLITAN AREA CLINIC 35 | Facility: CLINIC | Age: 75
End: 2020-01-29

## 2020-01-29 DIAGNOSIS — Z96.1: ICD-10-CM

## 2020-01-29 DIAGNOSIS — H35.721: ICD-10-CM

## 2020-01-29 DIAGNOSIS — H35.3231: ICD-10-CM

## 2020-01-29 PROCEDURE — 92134 CPTRZ OPH DX IMG PST SGM RTA: CPT

## 2020-01-29 PROCEDURE — 67028 INJECTION EYE DRUG: CPT

## 2020-01-29 PROCEDURE — 92014 COMPRE OPH EXAM EST PT 1/>: CPT | Mod: 25

## 2020-01-29 ASSESSMENT — TONOMETRY
OD_IOP_MMHG: 12
OS_IOP_MMHG: 11

## 2020-01-29 ASSESSMENT — VISUAL ACUITY
OS_CC: 20/40-2
OD_CC: 20/400

## 2020-03-04 ENCOUNTER — FOLLOW UP (OUTPATIENT)
Dept: URBAN - METROPOLITAN AREA CLINIC 35 | Facility: CLINIC | Age: 75
End: 2020-03-04

## 2020-03-04 DIAGNOSIS — Z96.1: ICD-10-CM

## 2020-03-04 DIAGNOSIS — H35.3231: ICD-10-CM

## 2020-03-04 DIAGNOSIS — H35.721: ICD-10-CM

## 2020-03-04 PROCEDURE — 67028 INJECTION EYE DRUG: CPT

## 2020-03-04 PROCEDURE — 92012 INTRM OPH EXAM EST PATIENT: CPT | Mod: 25

## 2020-03-04 PROCEDURE — 92134 CPTRZ OPH DX IMG PST SGM RTA: CPT

## 2020-03-04 ASSESSMENT — VISUAL ACUITY
OS_CC: 20/40
OD_CC: CF 6FT

## 2020-03-04 ASSESSMENT — TONOMETRY
OD_IOP_MMHG: 17
OS_IOP_MMHG: 18

## 2020-03-11 ENCOUNTER — PROCEDURE ONLY (OUTPATIENT)
Dept: URBAN - METROPOLITAN AREA CLINIC 35 | Facility: CLINIC | Age: 75
End: 2020-03-11

## 2020-03-11 DIAGNOSIS — H35.3231: ICD-10-CM

## 2020-03-11 PROCEDURE — 67028 INJECTION EYE DRUG: CPT

## 2020-03-11 ASSESSMENT — VISUAL ACUITY: OD_CC: 20/400+1

## 2020-03-11 ASSESSMENT — TONOMETRY: OD_IOP_MMHG: 18

## 2020-04-15 ENCOUNTER — FOLLOW UP (OUTPATIENT)
Dept: URBAN - METROPOLITAN AREA CLINIC 35 | Facility: CLINIC | Age: 75
End: 2020-04-15

## 2020-04-15 DIAGNOSIS — Z96.1: ICD-10-CM

## 2020-04-15 DIAGNOSIS — H35.721: ICD-10-CM

## 2020-04-15 DIAGNOSIS — H35.3231: ICD-10-CM

## 2020-04-15 PROCEDURE — 92134 CPTRZ OPH DX IMG PST SGM RTA: CPT

## 2020-04-15 PROCEDURE — 92012 INTRM OPH EXAM EST PATIENT: CPT

## 2020-04-15 ASSESSMENT — VISUAL ACUITY
OS_CC: 20/40+1
OD_CC: CF 4FT

## 2020-04-15 ASSESSMENT — TONOMETRY
OS_IOP_MMHG: 16
OD_IOP_MMHG: 14

## 2020-05-06 ENCOUNTER — FOLLOW UP (OUTPATIENT)
Dept: URBAN - METROPOLITAN AREA CLINIC 35 | Facility: CLINIC | Age: 75
End: 2020-05-06

## 2020-05-06 DIAGNOSIS — Z96.1: ICD-10-CM

## 2020-05-06 DIAGNOSIS — H35.3231: ICD-10-CM

## 2020-05-06 DIAGNOSIS — H35.721: ICD-10-CM

## 2020-05-06 PROCEDURE — 67028 INJECTION EYE DRUG: CPT

## 2020-05-06 PROCEDURE — 92012 INTRM OPH EXAM EST PATIENT: CPT | Mod: 25

## 2020-05-06 PROCEDURE — 92134 CPTRZ OPH DX IMG PST SGM RTA: CPT

## 2020-05-06 ASSESSMENT — VISUAL ACUITY
OS_CC: 20/40+1
OD_CC: CF 4FT

## 2020-05-06 ASSESSMENT — TONOMETRY
OS_IOP_MMHG: 14
OD_IOP_MMHG: 15

## 2020-07-15 ENCOUNTER — FOLLOW UP (OUTPATIENT)
Dept: URBAN - METROPOLITAN AREA CLINIC 35 | Facility: CLINIC | Age: 75
End: 2020-07-15

## 2020-07-15 DIAGNOSIS — Z96.1: ICD-10-CM

## 2020-07-15 DIAGNOSIS — H35.721: ICD-10-CM

## 2020-07-15 DIAGNOSIS — H35.3231: ICD-10-CM

## 2020-07-15 PROCEDURE — 92014 COMPRE OPH EXAM EST PT 1/>: CPT

## 2020-07-15 PROCEDURE — 92134 CPTRZ OPH DX IMG PST SGM RTA: CPT

## 2020-07-15 ASSESSMENT — VISUAL ACUITY
OD_CC: CF 2FT
OS_CC: 20/30-2

## 2020-07-15 ASSESSMENT — TONOMETRY
OD_IOP_MMHG: 15
OS_IOP_MMHG: 16

## 2020-08-12 ENCOUNTER — FOLLOW UP (OUTPATIENT)
Dept: URBAN - METROPOLITAN AREA CLINIC 35 | Facility: CLINIC | Age: 75
End: 2020-08-12

## 2020-08-12 DIAGNOSIS — Z96.1: ICD-10-CM

## 2020-08-12 DIAGNOSIS — H35.721: ICD-10-CM

## 2020-08-12 DIAGNOSIS — H35.3231: ICD-10-CM

## 2020-08-12 PROCEDURE — 92134 CPTRZ OPH DX IMG PST SGM RTA: CPT

## 2020-08-12 PROCEDURE — 92012 INTRM OPH EXAM EST PATIENT: CPT

## 2020-08-12 ASSESSMENT — TONOMETRY
OD_IOP_MMHG: 15
OS_IOP_MMHG: 16

## 2020-08-12 ASSESSMENT — VISUAL ACUITY
OD_CC: CF 1FT
OS_CC: 20/40+1

## 2020-09-16 ENCOUNTER — FOLLOW UP (OUTPATIENT)
Dept: URBAN - METROPOLITAN AREA CLINIC 35 | Facility: CLINIC | Age: 75
End: 2020-09-16

## 2020-09-16 DIAGNOSIS — H53.9: ICD-10-CM

## 2020-09-16 DIAGNOSIS — H35.3231: ICD-10-CM

## 2020-09-16 DIAGNOSIS — Z96.1: ICD-10-CM

## 2020-09-16 DIAGNOSIS — H35.721: ICD-10-CM

## 2020-09-16 PROCEDURE — 67028 INJECTION EYE DRUG: CPT

## 2020-09-16 PROCEDURE — 92134 CPTRZ OPH DX IMG PST SGM RTA: CPT

## 2020-09-16 PROCEDURE — 92012 INTRM OPH EXAM EST PATIENT: CPT | Mod: 25

## 2020-09-16 ASSESSMENT — TONOMETRY
OD_IOP_MMHG: 17
OS_IOP_MMHG: 18

## 2020-09-16 ASSESSMENT — VISUAL ACUITY
OS_CC: 20/50+1
OD_CC: CF 2FT

## 2020-09-30 ENCOUNTER — PROCEDURE ONLY (OUTPATIENT)
Dept: URBAN - METROPOLITAN AREA CLINIC 35 | Facility: CLINIC | Age: 75
End: 2020-09-30

## 2020-09-30 DIAGNOSIS — H35.3231: ICD-10-CM

## 2020-09-30 PROCEDURE — 67028 INJECTION EYE DRUG: CPT

## 2020-09-30 ASSESSMENT — VISUAL ACUITY: OD_CC: CF 1FT

## 2020-09-30 ASSESSMENT — TONOMETRY: OD_IOP_MMHG: 18

## 2020-12-16 ENCOUNTER — 3 MONTH EXAM (OUTPATIENT)
Dept: URBAN - METROPOLITAN AREA CLINIC 35 | Facility: CLINIC | Age: 75
End: 2020-12-16

## 2020-12-16 DIAGNOSIS — H35.721: ICD-10-CM

## 2020-12-16 DIAGNOSIS — H35.3231: ICD-10-CM

## 2020-12-16 DIAGNOSIS — Z96.1: ICD-10-CM

## 2020-12-16 PROCEDURE — 92014 COMPRE OPH EXAM EST PT 1/>: CPT

## 2020-12-16 PROCEDURE — 92134 CPTRZ OPH DX IMG PST SGM RTA: CPT

## 2020-12-16 ASSESSMENT — TONOMETRY
OD_IOP_MMHG: 17
OS_IOP_MMHG: 17

## 2020-12-16 ASSESSMENT — VISUAL ACUITY
OS_CC: 20/40-2
OD_CC: CF 2FT

## 2020-12-30 ENCOUNTER — FOLLOW UP (OUTPATIENT)
Dept: URBAN - METROPOLITAN AREA CLINIC 35 | Facility: CLINIC | Age: 75
End: 2020-12-30

## 2020-12-30 DIAGNOSIS — H35.3231: ICD-10-CM

## 2020-12-30 DIAGNOSIS — H35.721: ICD-10-CM

## 2020-12-30 DIAGNOSIS — Z96.1: ICD-10-CM

## 2020-12-30 PROCEDURE — 92134 CPTRZ OPH DX IMG PST SGM RTA: CPT

## 2020-12-30 PROCEDURE — 92012 INTRM OPH EXAM EST PATIENT: CPT | Mod: 25

## 2020-12-30 PROCEDURE — 67028 INJECTION EYE DRUG: CPT

## 2020-12-30 ASSESSMENT — TONOMETRY: OS_IOP_MMHG: 18

## 2020-12-30 ASSESSMENT — VISUAL ACUITY: OS_CC: 20/40-1

## 2021-01-13 ENCOUNTER — PROCEDURE ONLY (OUTPATIENT)
Dept: URBAN - METROPOLITAN AREA CLINIC 35 | Facility: CLINIC | Age: 76
End: 2021-01-13

## 2021-01-13 DIAGNOSIS — H35.3231: ICD-10-CM

## 2021-01-13 PROCEDURE — 67028 INJECTION EYE DRUG: CPT

## 2021-01-13 ASSESSMENT — VISUAL ACUITY: OD_CC: CF 3FT

## 2021-01-13 ASSESSMENT — TONOMETRY: OD_IOP_MMHG: 17

## 2021-04-28 ENCOUNTER — FOLLOW UP (OUTPATIENT)
Dept: URBAN - METROPOLITAN AREA CLINIC 35 | Facility: CLINIC | Age: 76
End: 2021-04-28

## 2021-04-28 DIAGNOSIS — H35.3231: ICD-10-CM

## 2021-04-28 DIAGNOSIS — H53.9: ICD-10-CM

## 2021-04-28 DIAGNOSIS — H35.721: ICD-10-CM

## 2021-04-28 DIAGNOSIS — Z96.1: ICD-10-CM

## 2021-04-28 PROCEDURE — 92014 COMPRE OPH EXAM EST PT 1/>: CPT | Mod: 25

## 2021-04-28 PROCEDURE — 92134 CPTRZ OPH DX IMG PST SGM RTA: CPT

## 2021-04-28 PROCEDURE — 67028 INJECTION EYE DRUG: CPT

## 2021-04-28 ASSESSMENT — TONOMETRY
OS_IOP_MMHG: 20
OD_IOP_MMHG: 20

## 2021-04-28 ASSESSMENT — VISUAL ACUITY
OS_CC: 20/100
OD_CC: CF 3FT

## 2021-05-05 ENCOUNTER — UNSCHEDULED FOLLOW UP (OUTPATIENT)
Dept: URBAN - METROPOLITAN AREA CLINIC 35 | Facility: CLINIC | Age: 76
End: 2021-05-05

## 2021-05-05 DIAGNOSIS — H35.3231: ICD-10-CM

## 2021-05-05 DIAGNOSIS — Z96.1: ICD-10-CM

## 2021-05-05 DIAGNOSIS — H20.9: ICD-10-CM

## 2021-05-05 DIAGNOSIS — H35.721: ICD-10-CM

## 2021-05-05 PROCEDURE — 92012 INTRM OPH EXAM EST PATIENT: CPT

## 2021-05-05 ASSESSMENT — VISUAL ACUITY
OD_SC: CF 2FT
OS_SC: 20/100-1

## 2021-05-05 ASSESSMENT — TONOMETRY
OD_IOP_MMHG: 17
OD_IOP_MMHG: 18

## 2024-07-10 ENCOUNTER — CARE COORDINATION (OUTPATIENT)
Dept: OTHER | Facility: CLINIC | Age: 79
End: 2024-07-10

## 2024-07-10 NOTE — CARE COORDINATION
Care Transitions Note    Initial Call - Call within 2 business days of discharge: Yes    Attempted to reach patient for transitions of care follow up. Unable to reach patient.    Outreach Attempts:   HIPAA compliant voicemail left for patient.     Patient: Fouzia Luciano    Patient : 1945   MRN: [id number]    Reason for Admission: Sigmoid Diverticulitis  Discharge Date: 24   RURS: No data recorded    Was this an external facility discharge? Yes. Discharge Date: 24. Facility Name: [facility]    Follow Up Appointment:   Patient does not have a follow up appointment scheduled at time of call.  Will address if successful outreach      Plan for follow-up on next business day.      JACQUELYN Briggs RN  Ambulatory Care Manager  Phone: [phone]  Email: [email or web address]

## 2024-07-11 ENCOUNTER — CARE COORDINATION (OUTPATIENT)
Dept: OTHER | Facility: CLINIC | Age: 79
End: 2024-07-11

## 2024-07-11 RX ORDER — ROSUVASTATIN CALCIUM 40 MG/1
40 TABLET, COATED ORAL EVERY EVENING
COMMUNITY

## 2024-07-11 RX ORDER — ATENOLOL 25 MG/1
25 TABLET ORAL NIGHTLY
COMMUNITY

## 2024-07-11 RX ORDER — SACCHAROMYCES BOULARDII 250 MG
250 CAPSULE ORAL 2 TIMES DAILY
COMMUNITY

## 2024-07-11 RX ORDER — OXYCODONE HYDROCHLORIDE 5 MG/1
5 TABLET ORAL EVERY 6 HOURS PRN
COMMUNITY

## 2024-07-11 RX ORDER — ACETAMINOPHEN 500 MG
1000 TABLET ORAL EVERY 6 HOURS PRN
COMMUNITY

## 2024-07-11 RX ORDER — LISINOPRIL 40 MG/1
40 TABLET ORAL NIGHTLY
COMMUNITY

## 2024-07-11 RX ORDER — AMLODIPINE BESYLATE 5 MG/1
5 TABLET ORAL NIGHTLY
COMMUNITY

## 2024-07-11 NOTE — CARE COORDINATION
Care Transitions Note    Initial Call - Call within 2 business days of discharge: Yes    Patient Current Location:  [place]    Care Transition Nurse contacted the patient by telephone to perform post hospital discharge assessment, verified name and  as identifiers. Provided introduction to self, and explanation of the Care Transition Nurse role.     Patient: Fouzia Luciano    Patient : 1945   MRN: [id number]    Reason for Admission: Sigmoid Diverticulitis   Discharge Date: 24   RURS: No data recorded    Was this an external facility discharge? Yes. Discharge Date: 24. Facility Name: [facility]    Additional needs identified to be addressed with provider   No needs identified             Method of communication with provider: none.    Patients top risk factors for readmission: medical condition-patient had recent elective surgery followed by 6 day IP hospitalization. Past history of HLD, HTN, A. Fib, Macular degeneration, skin cancer, diverticulitis.     Interventions to address risk factors:   Education: discussed follow up appointments  Review of patient management of conditions/medications: discussed medication management and monitoring op sites. ACM reviewed red flags with patient and when to call doctor/when to go to ED.    Care Summary Note: ACM called patient for CT outreach after recent IP hospitalization. She reports doing well since home. Denies redness, drainage, warmth, or signs of infection to op sites. Denies uncontrolled pain. She got Oxycodone script filled but has not taken any of the medication. States she does not need Oxycodone; has been taking Tylenol PRN which was twice yesterday. Patient reports \"low grade fever\" of 100.4 yesterday which resolved after taking 2 Tylenol. ACM discussed red flags and when to call provider vs when to go to ED for care. She verbalized understanding.    Patient states planning to start drinking protein shakes again.

## 2024-08-12 ENCOUNTER — CARE COORDINATION (OUTPATIENT)
Dept: OTHER | Facility: CLINIC | Age: 79
End: 2024-08-12

## 2024-08-12 NOTE — CARE COORDINATION
Ambulatory Care Coordination Note     8/12/2024 3:57 PM     Patient assigned via [facility] report. Chart reviewed for potential CM needs. Patient recently declined CM services. No recent utilization noted. No CM needs identified at this time.

## 2025-03-11 ENCOUNTER — CARE COORDINATION (OUTPATIENT)
Dept: OTHER | Facility: CLINIC | Age: 80
End: 2025-03-11

## 2025-03-11 NOTE — CARE COORDINATION
Ambulatory Care Coordination Note     3/11/2025 2:14 PM     Patient outreach attempt by this AC today to offer care management services. ACM was unable to reach the patient by telephone today;   left voice message requesting a return phone call to this ACM.     ACM: Namita Goodrich RN     Care Summary Note: Unable to reach patient at this time.        Follow Up:   Plan for next AC outreach in approximately 1-2 days  to complete:  - outreach attempt to offer care management services.      Namita Goodrich RN BSN  [phone]

## 2025-03-12 ENCOUNTER — CARE COORDINATION (OUTPATIENT)
Dept: OTHER | Facility: CLINIC | Age: 80
End: 2025-03-12

## 2025-03-12 NOTE — CARE COORDINATION
Ambulatory Care Coordination Note     3/12/2025 9:02 AM     Patient outreach attempt by this AC today to offer care management services. ACM was unable to reach the patient by telephone today;   left voice message requesting a return phone call to this ACM.     ACM: Namita Goodrich RN     Care Summary Note: Unable to reach patient at this time.        Follow Up:   Plan for next AC outreach in approximately 1 week to complete:  - outreach attempt to offer care management services.      Namita Goodrich RN BSN  [phone]

## 2025-03-13 ENCOUNTER — CARE COORDINATION (OUTPATIENT)
Dept: OTHER | Facility: CLINIC | Age: 80
End: 2025-03-13

## 2025-03-13 RX ORDER — NITROGLYCERIN 0.4 MG/1
0.4 TABLET SUBLINGUAL EVERY 5 MIN PRN
COMMUNITY

## 2025-03-13 RX ORDER — ASCORBIC ACID 500 MG
500 TABLET ORAL DAILY
COMMUNITY

## 2025-03-13 RX ORDER — DOCUSATE SODIUM 100 MG/1
100 CAPSULE, LIQUID FILLED ORAL 2 TIMES DAILY
COMMUNITY

## 2025-03-13 NOTE — CARE COORDINATION
Care Transitions Note    Called pt to inform her that Cardiology appt has been scheduled for 03/28/25 at 8:15am with Dr. Burrell. Pt expressed thanks for appt. CT Nurse asked if she had order for MINERVA. Need to obtain clarification from Cardiology office.     Namita Goodrich RN BSN  [phone]

## 2025-03-13 NOTE — CARE COORDINATION
Care Transitions Note    Cleo from Dr. Hope's (Cardiology) office called and LVM stating he ordered a 2D ECHO for her and order is in for this, and he will talk to her about a MINERVA at the visit.     Namita Goodrich RN BSN  [phone]

## 2025-03-13 NOTE — CARE COORDINATION
Care Transitions Note    Called pt to inform her that no need to schedule MINERVA, that Dr. Hope will talk to her at the visit about this. Patient expressed thanks for the call.      Namita Goodrich RN BSN  [phone]

## 2025-03-13 NOTE — CARE COORDINATION
Care Transitions Note    Called Dr. Hope's office and confirmed pt has 2D ECHO scheduled for 03/18/25 at 1pm and pt has been notified.     Namita Goodrich RN BSN  [phone]

## 2025-03-13 NOTE — CARE COORDINATION
Care Transitions Note    Call to Dr. Hope's office to schedule 2D ECHO. Left message and staff asks they call writer back in one hour.    Namita Goodrich RN BSN  [phone]

## 2025-03-13 NOTE — CARE COORDINATION
Care Transitions Note    Initial Call - Call within 2 business days of discharge: Yes    Patient Current Location:  [place]    Care Transition Nurse contacted the patient by telephone to perform post hospital discharge assessment, verified name and  as identifiers. Provided introduction to self, and explanation of the Care Transition Nurse role.     Patient: Fouzia Luciano    Patient : 1945   MRN: [id number]    Reason for Admission: Chest pain, palpitations, Paroxysmal Atrial Fibrillation   Discharge Date:  25      Last Discharge Facility [facility]                   Was this an external facility discharge? Yes. Discharge Date: 25. Facility Name: [facility]     Additional needs identified to be addressed with provider   No needs identified             Method of communication with provider: phone.    Patients top risk factors for readmission: ineffective coping and medical condition-Atrial Fibrillation     Interventions to address risk factors:   Education: Living with Atrial Fibrillation   Review of patient management of conditions/medications: Cardiac or respiratory symptoms   Communication with providers: Cardiology follow-up, notification of BP/P outside target range     Care Summary Note: Pt reports she has had no cardiac symptoms of concern since discharge from hospital on 25. Pt states she understands all discharge instructions, although did not allow CT Nurse to thoroughly review with her. Pt states she is checking BP and pulse and knows target parameters. She has called Cardiology office and is awaiting return call for follow-up appt. (CT Nurse called office and was able to schedule for 25 at 8:15AM.) Pt is agreeable to CT follow-up call in one week. Pt knowledgeable about red flags relevant to condition, and when/where to seek treatment. Encouraged pt to call ACM with any questions or concerns.      Care Transition Nurse reviewed

## 2025-03-31 ENCOUNTER — CARE COORDINATION (OUTPATIENT)
Dept: OTHER | Facility: CLINIC | Age: 80
End: 2025-03-31

## 2025-03-31 NOTE — CARE COORDINATION
Care Transitions Note    Follow Up Call     Attempted to reach patient for transitions of care follow up.  Unable to reach patient.      Outreach Attempts:   HIPAA compliant voicemail left for patient.     Care Summary Note: Unable to reach patient at this time.      Follow Up Appointment:       Plan for follow-up call in 11-14 days based on severity of symptoms and risk factors. Plan for next call: symptom management-BP, cardiac review, hernia concerns  follow-up appointment-Cardiology, General surgery     Namita Goodrich RN BSN  [phone]

## 2025-04-11 ENCOUNTER — CARE COORDINATION (OUTPATIENT)
Dept: OTHER | Facility: CLINIC | Age: 80
End: 2025-04-11

## 2025-04-11 NOTE — CARE COORDINATION
Care Transitions Note    Final Call     Attempted to reach patient for transitions of care follow up.  Unable to reach patient.      Outreach Attempts:   HIPAA compliant voicemail left for patient.     Patient closed (unable to reach patient) from the Care Transitions program on 04/11/25.  Patient/family has the ability to self-manage at this time..      Handoff:   Patient was not referred to the ACM team due to unable to contact patient.      Care Summary Note: Unable to reach patient at this time.  [facility] program closed due to no readmission.     Namita Goodrich RN BSN  [phone]